# Patient Record
Sex: FEMALE | Race: WHITE | NOT HISPANIC OR LATINO | Employment: FULL TIME | URBAN - METROPOLITAN AREA
[De-identification: names, ages, dates, MRNs, and addresses within clinical notes are randomized per-mention and may not be internally consistent; named-entity substitution may affect disease eponyms.]

---

## 2018-09-07 RX ORDER — MELOXICAM 15 MG/1
15 TABLET ORAL DAILY
COMMUNITY
End: 2018-09-08

## 2018-09-07 RX ORDER — HYDROCODONE BITARTRATE AND ACETAMINOPHEN 5; 325 MG/1; MG/1
1 TABLET ORAL EVERY 6 HOURS PRN
COMMUNITY
End: 2018-09-08

## 2018-09-07 RX ORDER — GABAPENTIN 300 MG/1
300 CAPSULE ORAL 3 TIMES DAILY
COMMUNITY

## 2018-09-07 RX ORDER — IBUPROFEN 600 MG/1
600 TABLET ORAL EVERY 6 HOURS PRN
COMMUNITY

## 2018-09-07 RX ORDER — CARBAMAZEPINE 200 MG/1
200 TABLET ORAL 3 TIMES DAILY
COMMUNITY

## 2018-09-07 RX ORDER — TRAMADOL HYDROCHLORIDE 50 MG/1
50 TABLET ORAL EVERY 6 HOURS PRN
COMMUNITY
End: 2018-09-08

## 2018-09-08 ENCOUNTER — OFFICE VISIT (OUTPATIENT)
Dept: NEUROSURGERY | Facility: CLINIC | Age: 53
End: 2018-09-08

## 2018-09-08 VITALS
TEMPERATURE: 97.4 F | SYSTOLIC BLOOD PRESSURE: 144 MMHG | BODY MASS INDEX: 22.15 KG/M2 | HEIGHT: 63 IN | WEIGHT: 125 LBS | DIASTOLIC BLOOD PRESSURE: 80 MMHG

## 2018-09-08 DIAGNOSIS — M50.30 DEGENERATIVE DISC DISEASE, CERVICAL: ICD-10-CM

## 2018-09-08 DIAGNOSIS — M51.36 DEGENERATIVE DISC DISEASE, LUMBAR: Primary | ICD-10-CM

## 2018-09-08 PROCEDURE — 99203 OFFICE O/P NEW LOW 30 MIN: CPT | Performed by: NEUROLOGICAL SURGERY

## 2018-09-08 RX ORDER — NABUMETONE 750 MG/1
750 TABLET, FILM COATED ORAL 2 TIMES DAILY
Qty: 60 TABLET | Refills: 1 | Status: SHIPPED | OUTPATIENT
Start: 2018-09-08

## 2018-09-08 RX ORDER — METHOCARBAMOL 750 MG/1
750 TABLET, FILM COATED ORAL NIGHTLY
Qty: 30 TABLET | Refills: 1 | Status: SHIPPED | OUTPATIENT
Start: 2018-09-08

## 2018-09-08 NOTE — PATIENT INSTRUCTIONS
call Dr. Jimenez on a Monday or Tuesday with an update.   Ask for  Chasidy  and leave a message for  Dr. Jimenez.  He will call you back at the end of the day as soon as he can.     [phone]    Call 2 weeks

## 2018-09-08 NOTE — PROGRESS NOTES
[facility]  1965  [id number]      Chief Complaint   Patient presents with   • Back Pain       HISTORY OF PRESENT ILLNESS:  This is a 53-year-old female who presents with a long history of back pain which in the last few months have radiated into the left leg in a ill-defined nondermatomal pattern.  She is been to physical therapy in the past which has not helped.  She is been on meloxicam which has not helped.  Lumbar MRI was performed and she is referred for neurosurgical consultation.  The symptoms are with her persistently; nothing helps or makes matters worse.  Her job duties and include standing for 10-12 hours for daily shifts.    Past Medical History:   Diagnosis Date   • Aneurysm of ophthalmic artery    • Bronchitis    • Insomnia    • Migraines    • Seizures (CMS/HCC)        Past Surgical History:   Procedure Laterality Date   • CHOLECYSTECTOMY     • ROTATOR CUFF REPAIR     • TUBAL ABDOMINAL LIGATION         Family History   Problem Relation Age of Onset   • Diabetes Mother    • Hypertension Mother    • Migraines Mother    • Diabetes Father    • Hypertension Father        Social History     Social History   • Marital status:      Spouse name: N/A   • Number of children: N/A   • Years of education: N/A     Occupational History   • Not on file.     Social History Main Topics   • Smoking status: Current Every Day Smoker     Packs/day: 0.50     Types: Cigarettes   • Smokeless tobacco: Not on file   • Alcohol use Yes   • Drug use: Unknown   • Sexual activity: Not on file     Other Topics Concern   • Not on file     Social History Narrative   • No narrative on file       Allergies   Allergen Reactions   • Asa [Aspirin] Nausea Only         Current Outpatient Prescriptions:   •  carBAMazepine (TEGretol) 200 MG tablet, Take 200 mg by mouth 3 (Three) Times a Day., Disp: , Rfl:   •  gabapentin (NEURONTIN) 300 MG capsule, Take 300 mg by mouth 3 (Three) Times a Day., Disp: , Rfl:   •  ibuprofen  "(ADVIL,MOTRIN) 600 MG tablet, Take 600 mg by mouth Every 6 (Six) Hours As Needed for Mild Pain ., Disp: , Rfl:     Review of Systems   Constitutional: Positive for chills. Negative for activity change, appetite change, diaphoresis, fatigue, fever and unexpected weight change.   HENT: Positive for hearing loss. Negative for congestion, dental problem, drooling, ear discharge, ear pain, facial swelling, mouth sores, nosebleeds, postnasal drip, rhinorrhea, sinus pressure, sneezing, sore throat, tinnitus, trouble swallowing and voice change.    Eyes: Negative for photophobia, pain, discharge, redness, itching and visual disturbance.   Respiratory: Negative for apnea, cough, choking, chest tightness, shortness of breath, wheezing and stridor.    Cardiovascular: Negative for chest pain, palpitations and leg swelling.   Gastrointestinal: Negative for abdominal distention, abdominal pain, anal bleeding, blood in stool, constipation, diarrhea, nausea, rectal pain and vomiting.   Endocrine: Positive for cold intolerance.   Musculoskeletal: Positive for back pain. Negative for arthralgias, gait problem, joint swelling, myalgias, neck pain and neck stiffness.   Skin: Negative for color change, pallor, rash and wound.   Allergic/Immunologic: Negative for environmental allergies, food allergies and immunocompromised state.   Neurological: Positive for seizures and headaches. Negative for dizziness, tremors, syncope, facial asymmetry, speech difficulty, weakness, light-headedness and numbness.   Hematological: Negative for adenopathy. Does not bruise/bleed easily.   Psychiatric/Behavioral: Negative for agitation, behavioral problems, confusion, decreased concentration, dysphoric mood, self-injury, sleep disturbance and suicidal ideas. The patient is not nervous/anxious and is not hyperactive.        Vitals:    09/08/18 0942   BP: 144/80   Temp: 97.4 °F (36.3 °C)   Weight: 56.7 kg (125 lb)   Height: 160 cm (63\")       Neurological " "Examination:    Mental status/speech: The patient is alert and oriented.  Speech is clear without aphysia or dysarthria.  No overt cognitive deficits.    Cranial nerve examination:    Olfaction: Smell is intact.  Vision: Vision is intact without visual field abnormalities.  Funduscopic examination is normal.  No pupillary irregularity.  Ocular motor examination: The extraocular muscles are intact.  There is no diplopia.  The pupil is round and reactive to both light and accommodation.  There is no nystagmus.  Facial movement/sensation: There is no facial weakness.  Sensation is intact in the first, second, and third divisions of the trigeminal nerve.  The corneal reflex is intact.  Auditory: Hearing is intact to finger rub bilaterally.  Cranial nerves IX, X, XI, XII: Phonation is normal.  No dysphagia.  Tongue is protruded in the midline without atrophy.  The gag reflex is intact.  Shoulder shrug is normal.    Musculoligamentous ligamentous examination: [She has decreased range of motion lumbar spine.  Straight leg raising, [place] and flip test are negative.  I find no evidence of weakness, sensory loss or reflex asymmetry.  Her gait is normal. ]          Medical Decision Making:     Diagnostic Data Set:  The lumbar MRI data set shows multilevel degenerative disc disease, disc space narrowing and facet arthrosis.  I do not see evidence of high-grade spinal or lateral recess stenosis, however.  There is no evidence of instability.      Assessment:  Advanced, diffuse degenerative osteoarthritis of the lumbar spine.          Recommendations:  Surgery is not a consideration.  She has extensive disease unfortunately for which there is no \"cure\".  I have given her prescription of Relafen 750 mg twice a day and Robaxin 750 at night to replace the Mobic.  She will call me in 2 weeks at which time if she is not better I would recommend facet block or epidural steroid injection.  Obviously she has progressive degenerative " change which may well worsen in the future.  I will keep you informed.        I greatly appreciate the opportunity to see and evaluate this individual.  If you have questions or concerns regarding issues that I may have overlooked please call me at any time: [phone].  Santos Jimenez M.D.  [facility]  [address] [place] [address].  [facility]  [id number]

## 2018-09-24 ENCOUNTER — TELEPHONE (OUTPATIENT)
Dept: NEUROSURGERY | Facility: CLINIC | Age: 53
End: 2018-09-24

## 2018-09-24 NOTE — TELEPHONE ENCOUNTER
----- Message from Chasidy Hines sent at 9/24/2018  3:39 PM EDT -----  Contact: [phone]  PATIENT CALLING TO REPORT ON HER CONDITION.  STATES MEDS DID NOT HELP.  HAS  BEEN TAKING THEM FOR 2 WEEKS.  HAS PAIN IN LOW BACK AND LEFT LEG.

## 2018-12-18 ENCOUNTER — OFFICE VISIT (OUTPATIENT)
Dept: ORTHOPEDIC SURGERY | Facility: CLINIC | Age: 53
End: 2018-12-18

## 2018-12-18 VITALS — HEART RATE: 69 BPM | WEIGHT: 121.47 LBS | OXYGEN SATURATION: 98 % | HEIGHT: 63 IN | BODY MASS INDEX: 21.52 KG/M2

## 2018-12-18 DIAGNOSIS — M25.511 CHRONIC RIGHT SHOULDER PAIN: Primary | ICD-10-CM

## 2018-12-18 DIAGNOSIS — M19.011 ARTHRITIS OF RIGHT ACROMIOCLAVICULAR JOINT: ICD-10-CM

## 2018-12-18 DIAGNOSIS — Z98.890 S/P COMPLETE REPAIR OF ROTATOR CUFF: ICD-10-CM

## 2018-12-18 DIAGNOSIS — G89.29 CHRONIC RIGHT SHOULDER PAIN: Primary | ICD-10-CM

## 2018-12-18 PROCEDURE — 99204 OFFICE O/P NEW MOD 45 MIN: CPT | Performed by: ORTHOPAEDIC SURGERY

## 2018-12-18 PROCEDURE — 20610 DRAIN/INJ JOINT/BURSA W/O US: CPT | Performed by: ORTHOPAEDIC SURGERY

## 2018-12-18 RX ORDER — TRAMADOL HYDROCHLORIDE 50 MG/1
TABLET ORAL
COMMUNITY
Start: 2018-12-04

## 2018-12-18 RX ORDER — LIDOCAINE HYDROCHLORIDE 10 MG/ML
3 INJECTION, SOLUTION INFILTRATION; PERINEURAL
Status: COMPLETED | OUTPATIENT
Start: 2018-12-18 | End: 2018-12-18

## 2018-12-18 RX ORDER — TRIAMCINOLONE ACETONIDE 40 MG/ML
40 INJECTION, SUSPENSION INTRA-ARTICULAR; INTRAMUSCULAR
Status: COMPLETED | OUTPATIENT
Start: 2018-12-18 | End: 2018-12-18

## 2018-12-18 RX ADMIN — LIDOCAINE HYDROCHLORIDE 3 ML: 10 INJECTION, SOLUTION INFILTRATION; PERINEURAL at 11:26

## 2018-12-18 RX ADMIN — TRIAMCINOLONE ACETONIDE 40 MG: 40 INJECTION, SUSPENSION INTRA-ARTICULAR; INTRAMUSCULAR at 11:26

## 2018-12-18 NOTE — PROGRESS NOTES
Procedure   Large Joint Arthrocentesis: R subacromial bursa  Date/Time: 12/18/2018 11:26 AM  Consent given by: patient  Site marked: site marked  Timeout: Immediately prior to procedure a time out was called to verify the correct patient, procedure, equipment, support staff and site/side marked as required   Supporting Documentation  Indications: pain   Procedure Details  Location: shoulder - R subacromial bursa  Preparation: Patient was prepped and draped in the usual sterile fashion  Needle size: 25 G  Approach: posterior  Medications administered: 3 mL lidocaine 1 %; 40 mg triamcinolone acetonide 40 MG/ML  Patient tolerance: patient tolerated the procedure well with no immediate complications

## 2018-12-18 NOTE — PROGRESS NOTES
[facility] Note    Subjective     Pain of the Right Shoulder ((R) Rotator Cuff Repair done 2009 by Dr. Quiles. Shoulder was operated on by Dr. Quiles 3xs between 5528-1903 (R) Shoulder pain started 2-3 wks ago. On average, aching and throbbing 10/10 pain. For relief, heat and Tramadol. )      LAI Jackson is a 53 y.o. female.  Patient is here today for evaluation of her right shoulder.  She is had 3 prior surgeries on her shoulder done by Dr. Quiles.  She had one surgery in 2009 and 2 subsequent surgeries in 2010.  She doesn't know the exact details of that was done but tells me her rotator cuff was repaired at least one of those times.  She says she did reasonably well after the procedures but over the last several weeks, she's had a gradual onset of pain in the shoulder.  She rates it as 10 out of 10 when it is the worst.  She has been using tramadol and heat.  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     Past Medical History:   Diagnosis Date   • Aneurysm of ophthalmic artery    • Bronchitis    • Insomnia    • Migraines    • Seizures (CMS/HCC)       Past Surgical History:   Procedure Laterality Date   • CHOLECYSTECTOMY     • ROTATOR CUFF REPAIR     • TUBAL ABDOMINAL LIGATION        Family History   Problem Relation Age of Onset   • Diabetes Mother    • Hypertension Mother    • Migraines Mother    • Diabetes Father    • Hypertension Father      Social History     Socioeconomic History   • Marital status:      Spouse name: Not on file   • Number of children: Not on file   • Years of education: Not on file   • Highest education level: Not on file   Social Needs   • Financial resource strain: Not on file   • Food insecurity - worry: Not on file   • Food insecurity - inability: Not on file   • Transportation needs - medical: Not on file   • Transportation needs - non-medical: Not on file   Occupational History   • Not on file   Tobacco Use   • Smoking  "status: Current Every Day Smoker     Packs/day: 0.50     Types: Cigarettes   Substance and Sexual Activity   • Alcohol use: Yes   • Drug use: Not on file   • Sexual activity: Not on file   Other Topics Concern   • Not on file   Social History Narrative   • Not on file      Current Outpatient Medications on File Prior to Visit   Medication Sig Dispense Refill   • carBAMazepine (TEGretol) 200 MG tablet Take 200 mg by mouth 3 (Three) Times a Day.     • gabapentin (NEURONTIN) 300 MG capsule Take 300 mg by mouth 3 (Three) Times a Day.     • ibuprofen (ADVIL,MOTRIN) 600 MG tablet Take 600 mg by mouth Every 6 (Six) Hours As Needed for Mild Pain .     • methocarbamol (ROBAXIN) 750 MG tablet Take 1 tablet by mouth Every Night. 30 tablet 1   • nabumetone (RELAFEN) 750 MG tablet Take 1 tablet by mouth 2 (Two) Times a Day. 60 tablet 1   • traMADol (ULTRAM) 50 MG tablet        No current facility-administered medications on file prior to visit.       Allergies   Allergen Reactions   • Asa [Aspirin] Nausea Only        The following portions of the patient's history were reviewed and updated as appropriate: allergies, current medications, past family history, past medical history, past social history, past surgical history and problem list.    Review of Systems   Constitutional: Negative.    HENT: Positive for hearing loss.    Eyes: Negative.    Respiratory: Negative.    Cardiovascular: Negative.    Gastrointestinal: Negative.    Endocrine: Negative.    Genitourinary: Negative.    Musculoskeletal: Positive for arthralgias (Shoulder ) and back pain.   Skin: Negative.    Allergic/Immunologic: Negative.    Neurological: Positive for seizures.   Hematological: Negative.    Psychiatric/Behavioral: Negative.         Objective      Physical Exam  Pulse 69   Ht 160 cm (62.99\")   Wt 55.1 kg (121 lb 7.6 oz)   SpO2 98%   BMI 21.52 kg/m²     Body mass index is 21.52 kg/m².    General  Mental Status - alert  General Appearance - " cooperative, well groomed, not in acute distress  Orientation - Oriented X3  Build & Nutrition - well developed and well nourished  Posture - normal posture  Gait - normal gait     Integumentary  Global Assessment  Examination of related systems reveals - no lymphadenopathy  Ears:  No abnormality  Nose:  No mucous drainage  General Characteristics  Overall examination of the patient's skin reveals - no rashes, no evidence of scars, no suspicious lesions and no bruises.  Color - normal coloration of skin.  Vascular: Brisk capillary refill in all extremities    Ortho Exam  Musculoskeletal   Upper Extremity   Right Shoulder     Inspection and Palpation:     AC Joint Tenderness - mild    Sensation is normal    Examination reveals no ecchymosis.        Strength and Tone:    Supraspinatus - 5/5 with pain    External Rotators-5/5    Infraspinatus - 5/5    Subscapularis - 5/5    Deltoid - 5/5     Range of Motion      RightShoulder:    Internal Rotation: ROM - L4    External Rotation: AROM - 60 degrees    Elevation through flexion: AROM - 140 degrees        Impingement   Right shoulder    Aguil[place]-Ke impingement test positive    Neer impingement test negative     Functional Testing   Right shoulder    AC crossover adduction test negative    Speeds test negative    Uppercut test negative    O'Briens test negative    Imaging/Studies  X-rays brought in with the patient are reviewed as well as a report.  Interpretation says that there are moderate degenerative changes and osteophytosis within the glenohumeral joint.  There was moderate acromioclavicular joint arthritis as well.  Date of the study is 11/28/2018.  These were done at [facility] in [place]    Assessment:  1. Chronic right shoulder pain    2. S/P complete repair of rotator cuff    3. Arthritis of right acromioclavicular joint        Plan:  1. Continue over-the-counter medication as needed for discomfort  2. Patient will be given a home therapy program because  going to formal PT is not practical because she cannot get off of work  3. Right subacromial injection will be given today for diagnostic and therapeutic purposes  4. She is no better in about 4-6 weeks, an MRI of her right shoulder will be requested along with the operative and office notes from her 3 surgeries.      Medical Decision Making  Management Options : over-the-counter medicine, prescription/IM medicine and physical/occupational therapy  Data/Risk: radiology tests and independent visualization of imaging, lab tests, or EMG/NCV    Christian Squires MD  12/18/18  6:30 PM

## 2022-09-16 DIAGNOSIS — M75.121 NONTRAUMATIC COMPLETE TEAR OF RIGHT ROTATOR CUFF: Primary | ICD-10-CM

## 2022-09-16 RX ORDER — DOCUSATE SODIUM 100 MG/1
100 CAPSULE, LIQUID FILLED ORAL 2 TIMES DAILY
Qty: 20 CAPSULE | Refills: 0 | Status: SHIPPED | OUTPATIENT
Start: 2022-09-16

## 2022-09-16 RX ORDER — ONDANSETRON 4 MG/1
4 TABLET, FILM COATED ORAL EVERY 8 HOURS PRN
Qty: 12 TABLET | Refills: 0 | Status: SHIPPED | OUTPATIENT
Start: 2022-09-16

## 2022-09-16 RX ORDER — OXYCODONE HYDROCHLORIDE 5 MG/1
5 TABLET ORAL EVERY 4 HOURS PRN
Qty: 24 TABLET | Refills: 0 | Status: SHIPPED | OUTPATIENT
Start: 2022-09-16